# Patient Record
Sex: MALE | Race: AMERICAN INDIAN OR ALASKA NATIVE
[De-identification: names, ages, dates, MRNs, and addresses within clinical notes are randomized per-mention and may not be internally consistent; named-entity substitution may affect disease eponyms.]

---

## 2020-02-18 ENCOUNTER — HOSPITAL ENCOUNTER (EMERGENCY)
Dept: HOSPITAL 5 - ED | Age: 40
LOS: 1 days | Discharge: HOME | End: 2020-02-19
Payer: MEDICARE

## 2020-02-18 DIAGNOSIS — E11.9: ICD-10-CM

## 2020-02-18 DIAGNOSIS — Z79.899: ICD-10-CM

## 2020-02-18 DIAGNOSIS — I16.0: Primary | ICD-10-CM

## 2020-02-18 DIAGNOSIS — R51: ICD-10-CM

## 2020-02-18 PROCEDURE — 70450 CT HEAD/BRAIN W/O DYE: CPT

## 2020-02-18 NOTE — CAT SCAN REPORT
CT HEAD WITHOUT CONTRAST



INDICATION:

ha with n v and high bp



TECHNIQUE: Axial slices were obtained through the head. Coronal and sagittal reformatted images were 
obtained.



COMPARISON:

None available.



FINDINGS:

There is no intracranial hemorrhage or extra-axial fluid collection. Ventricles, basilar cisterns, an
d sulci appear within normal limits for age. There is no mass lesion or midline shift. No acute yves
torial infarct is identified. There are hypodensities in the periventricular white matter. These are 
most prominent adjacent to the frontal horn of the right lateral ventricle and in the right parietal 
white matter that are noted bilaterally.



Bone windows demonstrate no acute osseous abnormality. There is been prior maxillary sinus surgery on
 the left. Paranasal sinuses appear clear.



 TECHNIQUE: All CT scans at this facility use dose modulation, iterative reconstruction, automated ex
posure control, weight based dosing, when appropriate, to reduce radiation dose to as low as reasonab
ly achievable.



IMPRESSION:

1. No intracranial hemorrhage is seen. There are areas of decreased attenuation in the periventricula
r white matter more prominent on the right. This likely represents microvascular  ischemic changes.



No large territorial infarct is identified.





Signer Name: Ramakrishna Fox MD 

Signed: 2/18/2020 11:24 PM

 Workstation Name: [id number]

## 2020-02-19 VITALS — DIASTOLIC BLOOD PRESSURE: 71 MMHG | SYSTOLIC BLOOD PRESSURE: 145 MMHG
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
## 2021-04-02 ENCOUNTER — HOSPITAL ENCOUNTER (OUTPATIENT)
Dept: HOSPITAL 5 - OR | Age: 41
Discharge: HOME | End: 2021-04-02
Attending: SURGERY
Payer: MEDICARE

## 2021-04-02 VITALS — SYSTOLIC BLOOD PRESSURE: 148 MMHG | DIASTOLIC BLOOD PRESSURE: 82 MMHG

## 2021-04-02 DIAGNOSIS — Z86.73: ICD-10-CM

## 2021-04-02 DIAGNOSIS — Z98.890: ICD-10-CM

## 2021-04-02 DIAGNOSIS — N18.6: ICD-10-CM

## 2021-04-02 DIAGNOSIS — I12.0: Primary | ICD-10-CM

## 2021-04-02 DIAGNOSIS — E66.9: ICD-10-CM

## 2021-04-02 DIAGNOSIS — E11.22: ICD-10-CM

## 2021-04-02 DIAGNOSIS — E78.00: ICD-10-CM

## 2021-04-02 LAB
BUN SERPL-MCNC: 46 MG/DL (ref 9–20)
BUN/CREAT SERPL: 6 %
CALCIUM SERPL-MCNC: 9.7 MG/DL (ref 8.4–10.2)
HCT VFR BLD CALC: 33.1 % (ref 35.5–45.6)
HEMOLYSIS INDEX: 5
HGB BLD-MCNC: 11.1 GM/DL (ref 11.8–15.2)
MCHC RBC AUTO-ENTMCNC: 34 % (ref 32–34)
MCV RBC AUTO: 90 FL (ref 84–94)
PLATELET # BLD: 293 K/MM3 (ref 140–440)
RBC # BLD AUTO: 3.67 M/MM3 (ref 3.65–5.03)

## 2021-04-02 PROCEDURE — 36415 COLL VENOUS BLD VENIPUNCTURE: CPT

## 2021-04-02 PROCEDURE — 36821 AV FUSION DIRECT ANY SITE: CPT

## 2021-04-02 PROCEDURE — 82962 GLUCOSE BLOOD TEST: CPT

## 2021-04-02 PROCEDURE — 80048 BASIC METABOLIC PNL TOTAL CA: CPT

## 2021-04-02 PROCEDURE — 76942 ECHO GUIDE FOR BIOPSY: CPT

## 2021-04-02 PROCEDURE — 93005 ELECTROCARDIOGRAM TRACING: CPT

## 2021-04-02 PROCEDURE — 64417 NJX AA&/STRD AX NERVE IMG: CPT

## 2021-04-02 PROCEDURE — 85027 COMPLETE CBC AUTOMATED: CPT

## 2021-04-02 NOTE — ANESTHESIA DAY OF SURGERY
Anesthesia Day of Surgery





- Day of Surgery


Patient Examined: Yes


Patient H&P Reviewed: Yes


Patient is NPO: Yes


Beta Blockers: No


Cardiac Clearance: No


Pulmonary Clearance: No


Magan's Test: N/A

## 2021-04-02 NOTE — SHORT STAY SUMMARY
Short Stay Documentation


Date of service: 04/02/21


Narrative H&P: 





See H&P





- History


H&P: obtained from office





- Allergies and Medications


Current Medications: 


                                    Allergies





No Known Allergies Allergy (Verified 03/30/21 09:23)


   





                                Home Medications











 Medication  Instructions  Recorded  Confirmed  Last Taken  Type


 


Pantoprazole [Protonix] 40 mg PO QDAY 04/01/21 04/01/21 03/31/21 History


 


amLODIPine 10 mg PO DAILY 04/01/21 04/01/21 03/31/21 History


 


glipiZIDE XL [Glucotrol Xl] 10 mg PO DAILY 04/01/21 04/01/21 03/31/21 History


 


Apixaban [Eliquis] 2.5 mg PO DAILY 04/02/21 04/02/21 3 Weeks Ago History





    ~03/12/21 


 


AtorvaSTATin [Lipitor] 40 mg PO QHS 04/02/21 04/02/21 03/31/21 History








Active Medications





Sodium Chloride (Nacl 0.9% 1000 Ml)  1,000 mls @ 42 mls/hr IV AS DIRECT STEFFI


   Last Admin: 04/02/21 08:55 Dose:  42 mls/hr


   Documented by: 


Cefazolin Sodium 3 gm/ Sodium (Chloride)  100 mls @ 100 mls/30 min IV PREOP NR; 

Protocol


   Stop: 04/02/21 13:00


Midazolam HCl (Midazolam 2 Mg/2 Ml Inj)  2 mg IV PREOP NR


   Stop: 04/02/21 23:59


   Last Admin: 04/02/21 10:03 Dose:  2 mg


   Documented by: 











- Brief post op/procedure progress note


Date of procedure: 04/02/21


Pre-op diagnosis: End-Stage Renal Disease


Post-op diagnosis: same


Procedure: 





Creation of Left Brachiocephalic Arteriovenous Fistula


Anesthesia: MAC, regional


Surgeon: ALE TRAN


Estimated blood loss: minimal


Pathology: none


Condition: stable





- Disposition


Condition at discharge: Good


Disposition: DC-01 TO HOME OR SELFCARE





Short Stay Discharge Plan


Activity: other (No heavy lifting with left arm for 2 weeks.  Please use a 

stress ball with left hand as often as possible.)


Wound: open to air, keep clean and dry, other (Okay to wash the wound with soap 

and water but do not soak in water for 2 weeks.)


Follow up with: 


ALE TRAN MD [Staff Physician] - 14 Days


Prescriptions: 


HYDROcodone/APAP 7.5-325 [[place] 7.5/325] 1 each PO Q6HR PRN #30 tablet


 PRN Reason: Pain

## 2021-04-02 NOTE — ELECTROCARDIOGRAPH REPORT
[facility]

                                       

Test Date:    2021               Test Time:    09:55:28

Pat Name:     SEGUNDO JOSEPH         Department:   

Patient ID:   [id number]          Room:          

Gender:       M                        Technician:   HAM

:          1980               Requested By: ABNER HAYNES

Order Number: [id number]              Reading MD:   Leigh Ann Birmingham

                                 Measurements

Intervals                              Axis          

Rate:         79                       P:            42

AK:           190                      QRS:          -6

QRSD:         104                      T:            28

QT:           389                                    

QTc:          445                                    

                           Interpretive Statements

Sinus rhythm

No previous ECG available for comparison

Electronically Signed On 2021 14:48:39 EDT by Leigh Ann Birmingham

## 2021-04-02 NOTE — ANESTHESIA CONSULTATION
Anesthesia Consult and Med Hx


Date of service: 04/02/21





- Airway


Anesthetic Teeth Evaluation: Edentulous


ROM Head & Neck: Adequate


Mental/Hyoid Distance: Adequate


Mallampati Class: Class III


Intubation Access Assessment: Possibly Difficult





- Pulmonary Exam


CTA: Yes





- Cardiac Exam


Cardiac Exam: RRR





- Pre-Operative Health Status


ASA Pre-Surgery Classification: ASA4


Proposed Anesthetic Plan: IV Sedation


Nerve Block: Axillary





- Cardiovascular System


Hx Hypertension: Yes





- Central Nervous System


CVA: Yes (Had 3 strokes in 1 week 11/2020- No defecits)


Hx Psychiatric Problems: No





- Gastrointestinal


Hx Ulcer: Yes (1/2021)





- Endocrine


Hx End Stage Renal Disease: Yes


Hx Non-Insulin Dependent Diabetes: Yes





- Other Systems


Hx Cancer: No


Hx Obesity: Yes





- Additional Comments


Anesthesia Medical History Comments: Dialysis thurs.  Past sx: permacath, 

circumcision

## 2021-04-02 NOTE — OPERATIVE REPORT
Operative Report


Operative Report: 





Date of procedure: 4/2/2021


 


Pre-operative diagnosis:   End-Stage Renal Disease


 


Post-operative diagnosis:  End-Stage Renal Disease


 


Procedure(s):  Creation of Left brachial Artery to Cephalic Vein Arteriovenous 

Fistula


 


Surgeon:  Cayden Perrin MD 


 


Assistant:  None


 


Anesthesia:  Regional/MAC


 


EBL:  Minimal


 


Counts:  Correct


 


Complications:  None


 


Condition:  Stable


 


Findings: Successful creation of left brachiocephalic arteriovenous fistula with

palpable thrill and palpable radial pulse at the completion of the case.


 


Specimen:  None


 


Indications:





The patient is a 41-year-old male with a history of end-stage renal disease who 

is currently on hemodialysis through a right internal jugular permacath.  He is 

in need of long-term dialysis access and was found to be a suitable candidate 

for creation of a left arm arteriovenous fistula.  He was given the risk, 

benefits, and alternative procedures and consented to the procedure.


 


Description of Procedure: 


 


The patient had a regional block of the patient's left arm performed in the 

preoperative area prior to being transported to the operating room.  Once the 

regional block was performed the patient was transported to the operating room 

and adequate sedation was given.  When the patient was sedated a timeout was 

performed and the patient's left arm was then prepped and draped in normal 

sterile fashion.  A transverse incision was then made and carried down to the 

cephalic vein using sharp dissection.  The vein was dissected out both 

proximally and distally and suture ligated and divided distally. I flushed the 

vein with heparinized saline and flow was controlled with a bulldog clamp.  I 

then dissected out the brachial artery through this incision circumferentially 

both proximal and distal and controlled the artery with vessel loops.  I 

systemically heparinized the patient with 3000 units of heparin IV and used 

angled DeBakey clamps to control flow through the artery.  I created an 

arteriotomy using an 11 blade and Srinivasan scissors.  I created an end to side 

anastomosis between the cephalic vein and brachial artery using a 6-0 Prolene in

running fashion.  Prior to completing the anastomosis I flashed the artery both 

proximally and distally and then flushed the anastomosis with heparinized saline

to remove any debris.  I then completed the anastomosis and removed all clamps 

allowing flow into the fistula which had an adequate thrill.  I achieved 

hemostasis with a combination of Quick Clot and electrocautery.  Once hemostasis

had been achieved I closed the wound in 2 layers using a 3-0 Vicryl in a running

fashion in the deep dermal layer and a 4-0 Monocryl in  running fashion in the 

subcuticular layer.  I then dressed the wound with Dermabond.  The patient 

tolerated the procedure well.  All sponge, needle, and instrument counts were 

correct.  The patient was taken to the recovery area in stable condition.